# Patient Record
Sex: FEMALE | Race: WHITE | URBAN - NONMETROPOLITAN AREA
[De-identification: names, ages, dates, MRNs, and addresses within clinical notes are randomized per-mention and may not be internally consistent; named-entity substitution may affect disease eponyms.]

---

## 2018-03-02 PROBLEM — H16.223: Noted: 2019-03-13

## 2018-03-02 PROBLEM — H52.03: Noted: 2018-03-02

## 2018-03-02 PROBLEM — H52.4: Noted: 2018-03-02

## 2018-03-02 PROBLEM — H25.13: Noted: 2019-03-13

## 2019-03-13 ENCOUNTER — IMPORTED ENCOUNTER (OUTPATIENT)
Dept: URBAN - NONMETROPOLITAN AREA CLINIC 1 | Facility: CLINIC | Age: 64
End: 2019-03-13

## 2019-03-13 PROCEDURE — 99213 OFFICE O/P EST LOW 20 MIN: CPT

## 2019-03-13 PROCEDURE — 92015 DETERMINE REFRACTIVE STATE: CPT

## 2019-03-13 NOTE — PATIENT DISCUSSION
TAWNY/MGD/Blepharitis OU:  - Discussed diagnosis in detail with patient- Signs/symptoms associated discussed including redness/irritation of the eyes and eyelids. - Patient didnt  Rx for FML due to cost - Recommend NOT using Visine products- Continue Systane Ultra OU BID-QID PRN- Continue hot compresses Le Mask nightly. - Recommended J&J Baby shampoo daily as well- Recommend increased water intake and fish oil supplement- May want to consider punctal plugs or Restasis in near future.  - Continue to monitor [address] OU- Discussed exam details with pt- Discussed signs and symptoms of progression - Discussed UV protection - Not visually significant at this time- BAT done today 20/100 OU - Continue to monitorPresbyopia OU- Discussed refractive status with pt- New glasses Rx given today- Continue to monitor; 's Notes: MR 3/13/19DFE 3/13/19DES Tx-----TheraTears - did not likeVisine - told not to [employer] [place] FMLBruder MaskIncrease S21Ataj Oil supplement

## 2019-07-15 ENCOUNTER — IMPORTED ENCOUNTER (OUTPATIENT)
Dept: URBAN - NONMETROPOLITAN AREA CLINIC 1 | Facility: CLINIC | Age: 64
End: 2019-07-15

## 2019-07-15 PROBLEM — H25.13: Noted: 2019-07-15

## 2019-07-15 PROBLEM — H16.223: Noted: 2019-07-15

## 2019-07-15 PROBLEM — H52.4: Noted: 2019-07-15

## 2019-07-15 PROBLEM — H10.423: Noted: 2019-07-15

## 2019-07-15 PROBLEM — H52.03: Noted: 2019-07-15

## 2019-07-15 PROCEDURE — 99213 OFFICE O/P EST LOW 20 MIN: CPT

## 2019-07-15 NOTE — PATIENT DISCUSSION
Ocular Allergies OUDiscussed findings in detail with patient. 1+injection and 1+papillae noted OU. Start Bepreve BID OU X 1 week then PRN sample given today and Rx sent to pharmacy. Continue to monitor. NOTES FROM PREVIOUS EXAM:TAWNY/MGD/Blepharitis OU:  - Discussed diagnosis in detail with patient- Signs/symptoms associated discussed including redness/irritation of the eyes and eyelids. - Patient didnt  Rx for FML due to cost - Recommend NOT using Visine products- Continue Systane Ultra OU BID-QID PRN- Continue hot compresses Le Mask nightly. - Recommended J&J Baby shampoo daily as well- Recommend increased water intake and fish oil supplement- May want to consider punctal plugs or Restasis in near future.  - Continue to monitor [address] OU- Discussed exam details with pt- Discussed signs and symptoms of progression - Discussed UV protection - Not visually significant at this time- BAT done today 20/100 OU - Continue to monitorPresbyopia OU- Discussed refractive status with pt- New glasses Rx given today- Continue to monitor; 's Notes: MR 3/13/19DFE 3/13/19DES Tx-----TheraTears - did not likeVisine - told not to [employer] [place] FMLBruder MaskIncrease [id number] Oil supplement

## 2020-09-21 NOTE — PATIENT DISCUSSION
- Follow up in 1 year for [facility] SERVICES OF [facility], MRx

## 2021-09-27 NOTE — PATIENT DISCUSSION
- Follow up in 1 year for [facility] SERVICES OF [facility], MRx

## 2022-04-09 ASSESSMENT — TONOMETRY
OD_IOP_MMHG: 15
OS_IOP_MMHG: 15
OD_IOP_MMHG: 11
OS_IOP_MMHG: 10

## 2022-04-09 ASSESSMENT — VISUAL ACUITY
OS_SC: 20/20
OD_GLARE: 20/100
OD_SC: 20/20-2
OS_GLARE: 20/100
OD_SC: 20/20
OS_SC: 20/25
OU_CC: 20/20

## 2022-09-14 ENCOUNTER — EMERGENCY VISIT (OUTPATIENT)
Dept: URBAN - NONMETROPOLITAN AREA CLINIC 1 | Facility: CLINIC | Age: 67
End: 2022-09-14

## 2022-09-14 DIAGNOSIS — H16.223: ICD-10-CM

## 2022-09-14 DIAGNOSIS — H10.423: ICD-10-CM

## 2022-09-14 PROCEDURE — 99203 OFFICE O/P NEW LOW 30 MIN: CPT

## 2022-09-14 RX ORDER — KETOROLAC TROMETHAMINE 5 MG/ML: 1 SOLUTION OPHTHALMIC TWICE A DAY

## 2022-09-14 ASSESSMENT — VISUAL ACUITY
OD_CC: 20/20-2
OS_CC: 20/20-1

## 2022-09-14 ASSESSMENT — TONOMETRY
OD_IOP_MMHG: 11
OS_IOP_MMHG: 11

## 2022-09-16 NOTE — PATIENT DISCUSSION
Discussed Vuity drops to help with worsening presbyopia. Given sample. Patient will call if she wants Rx sent.

## 2022-10-28 ENCOUNTER — ESTABLISHED PATIENT (OUTPATIENT)
Dept: URBAN - NONMETROPOLITAN AREA CLINIC 1 | Facility: CLINIC | Age: 67
End: 2022-10-28

## 2022-10-28 DIAGNOSIS — H52.4: ICD-10-CM

## 2022-10-28 DIAGNOSIS — H25.13: ICD-10-CM

## 2022-10-28 DIAGNOSIS — H16.223: ICD-10-CM

## 2022-10-28 PROCEDURE — 99214 OFFICE O/P EST MOD 30 MIN: CPT

## 2022-10-28 PROCEDURE — 92015 DETERMINE REFRACTIVE STATE: CPT

## 2022-10-28 ASSESSMENT — TONOMETRY
OS_IOP_MMHG: 12
OD_IOP_MMHG: 12

## 2022-10-28 ASSESSMENT — VISUAL ACUITY
OS_CC: 20/20-
OD_CC: 20/20-

## 2023-10-30 ENCOUNTER — ESTABLISHED PATIENT (OUTPATIENT)
Dept: URBAN - NONMETROPOLITAN AREA CLINIC 1 | Facility: CLINIC | Age: 68
End: 2023-10-30

## 2023-10-30 DIAGNOSIS — H25.13: ICD-10-CM

## 2023-10-30 DIAGNOSIS — H16.223: ICD-10-CM

## 2023-10-30 DIAGNOSIS — H52.4: ICD-10-CM

## 2023-10-30 PROCEDURE — 92015 DETERMINE REFRACTIVE STATE: CPT

## 2023-10-30 PROCEDURE — 99214 OFFICE O/P EST MOD 30 MIN: CPT

## 2023-10-30 ASSESSMENT — TONOMETRY
OD_IOP_MMHG: 12
OS_IOP_MMHG: 12

## 2023-10-30 ASSESSMENT — VISUAL ACUITY
OD_CC: 20/22-2
OU_CC: 20/20-
OS_CC: 20/20-2

## 2024-04-02 ENCOUNTER — EMERGENCY VISIT (OUTPATIENT)
Dept: URBAN - NONMETROPOLITAN AREA CLINIC 1 | Facility: CLINIC | Age: 69
End: 2024-04-02

## 2024-04-02 DIAGNOSIS — H16.202: ICD-10-CM

## 2024-04-02 PROCEDURE — 99214 OFFICE O/P EST MOD 30 MIN: CPT

## 2024-04-02 RX ORDER — TOBRAMYCIN AND DEXAMETHASONE 1; 3 MG/ML; MG/ML: 1 SUSPENSION/ DROPS OPHTHALMIC

## 2024-04-02 ASSESSMENT — TONOMETRY
OS_IOP_MMHG: 14
OD_IOP_MMHG: 14

## 2024-04-02 ASSESSMENT — VISUAL ACUITY
OS_CC: 20/30
OD_CC: 20/30

## 2024-10-31 ENCOUNTER — FOLLOW UP (OUTPATIENT)
Dept: URBAN - NONMETROPOLITAN AREA CLINIC 1 | Facility: CLINIC | Age: 69
End: 2024-10-31

## 2024-10-31 DIAGNOSIS — H16.223: ICD-10-CM

## 2024-10-31 DIAGNOSIS — H25.13: ICD-10-CM

## 2024-10-31 DIAGNOSIS — H52.4: ICD-10-CM

## 2024-10-31 PROCEDURE — 92014 COMPRE OPH EXAM EST PT 1/>: CPT

## 2024-10-31 PROCEDURE — 92015 DETERMINE REFRACTIVE STATE: CPT
